# Patient Record
Sex: FEMALE | Race: WHITE | Employment: PART TIME | URBAN - METROPOLITAN AREA
[De-identification: names, ages, dates, MRNs, and addresses within clinical notes are randomized per-mention and may not be internally consistent; named-entity substitution may affect disease eponyms.]

---

## 2017-05-17 NOTE — PROGRESS NOTES
No beds available at: [facility], [address] [facility], [facility], [address], [facility] [address] or [facility], [address], [address], [facility] [address], [facility], [place], [address] [facility] [address] [facility], [address] [facility], [place], [place], [place], [place], [place], [address], [facility]

## 2017-05-17 NOTE — ED NOTES
Pt spoke with  marcello. Pt ok with informing  of her condition and status. .  Pt and  updated that we are waiting on a room at [facility]  Pt calm  Pt assited to the bathroom and giving breakfast but has not eatten

## 2017-05-17 NOTE — ED NOTES
PT arrives for medical clearance. Pt calm and cooperative. Pt undressed into hospital gown and socks. All belongings bagged and labeled and locked per policy. Pt given water per request. Labs obtained and sent. Pt orientated to the room and plan of care.

## 2017-05-17 NOTE — ED PROVIDER NOTES
Patient Seen in: [facility] Emergency Department    History   Patient presents with:  Eval-P (psychiatric)    Stated Complaint: Eval p, medical clearance    HPI    78-year-old female with history of depression, anxiety, presents emergency room for m signs reviewed. All other systems reviewed and negative except as noted above. PSFH elements reviewed from today and agreed except as otherwise stated in HPI.     Physical Exam       ED Triage Vitals   BP 05/16/17 2324 118/84 mmHg   Pulse 05/16/17 2 1.07 (*)     All other components within normal limits   ETHYL ALCOHOL - Normal   ACETAMINOPHEN (TYLENOL), S - Normal   SALICYLATE, SERUM - Normal   CBC WITH DIFFERENTIAL WITH PLATELET    Narrative:      The following orders were created for panel order CBC

## 2017-05-17 NOTE — ED NOTES
[facility] FROM [facility] CALLED WITH ACCEPTING PHYSICIAN DR. J Luis Lee. PT WILL BE GOING TO ROOM 611-B, RN TO RN REPORT # [id number].

## 2017-07-24 PROCEDURE — 88175 CYTOPATH C/V AUTO FLUID REDO: CPT | Performed by: INTERNAL MEDICINE

## 2017-07-24 PROCEDURE — 87624 HPV HI-RISK TYP POOLED RSLT: CPT | Performed by: INTERNAL MEDICINE

## 2017-07-25 PROCEDURE — 84146 ASSAY OF PROLACTIN: CPT | Performed by: INTERNAL MEDICINE

## 2017-09-06 NOTE — ED NOTES
Patient has changed into patient gown and 2 bags of belongings placed into green safety bag # [id number].

## 2017-09-06 NOTE — ED NOTES
Call received from [facility] at [facility]. Awaiting specific bed assignment. Charge RN Allison Bejarano made aware and will get in contact with [facility] for updates.

## 2017-09-06 NOTE — ED NOTES
Spoke with Lucian at [facility] regarding transfer to [facility]. Informed that the nurse Wero Blackburn will be consulting with admitting psychiatrist and will call this RN for report when they are ready for patient to be transferred.

## 2017-09-06 NOTE — ED NOTES
Report received from Lady Herson RN at this time. Lady Bains had just received a call from Otis at [facility] at ext [phone] and to call back shortly.

## 2017-09-06 NOTE — ED PROVIDER NOTES
Patient Seen in: [facility] Emergency Department    History   Patient presents with:  Eval-P (psychiatric)    Stated Complaint: eval p, SI    HPI    25-year-old female sent here by her psychiatrist due to report of suicidal ideations.   The patient s Temporal  SpO2: 98 %  O2 Device: None (Room air)    Current:/89   Pulse 98   Temp 98 °F (36.7 °C) (Temporal)   Resp 18   Ht 165.1 cm (5' 5\")   Wt 81.2 kg   LMP 08/29/2017   SpO2 98%   BMI 29.79 kg/m²         Physical Exam      General: This a pleasa Discharge Medication List

## 2017-09-06 NOTE — PROGRESS NOTES
9/6/2017  5:36 PM         []Manual[]Template  []Copied  Tried to talked to Gaurav Anne RN, to give nurse to nurse report number but she is very busy with other patient. She will call me back.

## 2017-09-06 NOTE — ED NOTES
Pt noted at this time to have 6 stud earrings in each ear and lobes. Pt also noted to have what appears to be 2 maral bands on left 4th finger. One band with 3 large clear maral appearing stones and another flat band.

## 2017-09-06 NOTE — ED NOTES
Pt awake and alert, appears comfortable. Pt tolerated dinner tray well. Pt aware awaiting transport to [facility] at this time. Pt appears calm and cooperative, in no distress at this time.

## 2017-09-06 NOTE — ED INITIAL ASSESSMENT (HPI)
Patient arrives by ems from outpatient [facility] site for increasing depression/SI over past 3 months. Reports hx of SI but no suicidal attempts. Reports past inpatient hospitalizations for same. States she was started on lithium 1 week ago.  Arrives calm and

## 2017-09-06 NOTE — PROGRESS NOTES
Talked to Dixie Parents charge nurse that patient room number is 832-B, per Kavya Grimm RN [facility]) she already received a nurse to nurse report to [facility].

## 2017-09-06 NOTE — ED NOTES
Spoke with Royal Mancini from [facility] regarding patient. Informed that they already know about patient because Dr. Louie Contreras, her psychologist, wants to arrange direct admit. Cert/petition completed by Dr. Louie Contreras at patient's office visit today.  Faxed to [facility] at [facility]

## 2018-03-19 PROCEDURE — 87086 URINE CULTURE/COLONY COUNT: CPT | Performed by: INTERNAL MEDICINE

## 2018-04-16 ENCOUNTER — HOSPITAL ENCOUNTER (EMERGENCY)
Facility: HOSPITAL | Age: 33
Discharge: NURSING, REHAB OR LONG-TERM CARE | End: 2018-04-17
Attending: EMERGENCY MEDICINE
Payer: COMMERCIAL

## 2018-04-16 DIAGNOSIS — Z00.8 MEDICAL CLEARANCE FOR PSYCHIATRIC ADMISSION: Primary | ICD-10-CM

## 2018-04-16 PROCEDURE — 99285 EMERGENCY DEPT VISIT HI MDM: CPT

## 2018-04-16 PROCEDURE — 96372 THER/PROPH/DIAG INJ SC/IM: CPT

## 2018-04-17 VITALS
OXYGEN SATURATION: 98 % | HEART RATE: 80 BPM | WEIGHT: 180 LBS | BODY MASS INDEX: 29.99 KG/M2 | RESPIRATION RATE: 18 BRPM | TEMPERATURE: 97 F | DIASTOLIC BLOOD PRESSURE: 85 MMHG | HEIGHT: 65 IN | SYSTOLIC BLOOD PRESSURE: 127 MMHG

## 2018-04-17 PROBLEM — F33.2 MDD (MAJOR DEPRESSIVE DISORDER), RECURRENT EPISODE, SEVERE (HCC): Status: ACTIVE | Noted: 2018-04-17

## 2018-04-17 RX ORDER — LORAZEPAM 2 MG/ML
1 INJECTION INTRAMUSCULAR ONCE
Status: COMPLETED | OUTPATIENT
Start: 2018-04-17 | End: 2018-04-17

## 2018-04-17 RX ORDER — IBUPROFEN 600 MG/1
600 TABLET ORAL ONCE
Status: COMPLETED | OUTPATIENT
Start: 2018-04-17 | End: 2018-04-17

## 2018-04-17 NOTE — ED NOTES
For direct admit to [facility] under Dr Antoine Thorpe #[id number], awaiting call back fro report

## 2018-04-17 NOTE — ED PROVIDER NOTES
Patient Seen in: [facility] Emergency Department    History   Patient presents with:  Eval-P (psychiatric)    Stated Complaint: eval p    HPI     Patient is suicidal with plan.   She complains of feeling depressed and wants to kill herself by hanging Neurological: She is alert and oriented to person, place, and time. She exhibits normal muscle tone. Coordination normal.   Skin: Skin is warm and dry. No rash noted. Psychiatric: She has a normal mood and affect.  Her behavior is normal.   Nursing note

## 2018-04-17 NOTE — ED NOTES
[facility] staff called back with report of possible direct admite to [facility] upon completion of needed documents

## 2018-04-17 NOTE — ED INITIAL ASSESSMENT (HPI)
Brought in by ambulance from [facility] with petition for SI with plan. Apparently pt on OP group tx when she started to verbalizing SI with plans \"not feeling safe\".  Recently seen/discharge from [facility]

## 2018-04-24 ENCOUNTER — APPOINTMENT (OUTPATIENT)
Dept: ULTRASOUND IMAGING | Facility: HOSPITAL | Age: 33
End: 2018-04-24
Attending: EMERGENCY MEDICINE
Payer: COMMERCIAL

## 2018-04-24 ENCOUNTER — HOSPITAL ENCOUNTER (EMERGENCY)
Facility: HOSPITAL | Age: 33
Discharge: NURSING, REHAB OR LONG-TERM CARE | End: 2018-04-25
Attending: EMERGENCY MEDICINE
Payer: COMMERCIAL

## 2018-04-24 DIAGNOSIS — R10.30 LOWER ABDOMINAL PAIN: ICD-10-CM

## 2018-04-24 DIAGNOSIS — F32.A DEPRESSION, UNSPECIFIED DEPRESSION TYPE: Primary | ICD-10-CM

## 2018-04-24 DIAGNOSIS — R45.851 SUICIDAL IDEATION: ICD-10-CM

## 2018-04-24 PROCEDURE — 76856 US EXAM PELVIC COMPLETE: CPT | Performed by: EMERGENCY MEDICINE

## 2018-04-24 PROCEDURE — 87591 N.GONORRHOEAE DNA AMP PROB: CPT | Performed by: EMERGENCY MEDICINE

## 2018-04-24 PROCEDURE — 85025 COMPLETE CBC W/AUTO DIFF WBC: CPT | Performed by: EMERGENCY MEDICINE

## 2018-04-24 PROCEDURE — 80307 DRUG TEST PRSMV CHEM ANLYZR: CPT | Performed by: EMERGENCY MEDICINE

## 2018-04-24 PROCEDURE — 99285 EMERGENCY DEPT VISIT HI MDM: CPT

## 2018-04-24 PROCEDURE — 80053 COMPREHEN METABOLIC PANEL: CPT | Performed by: EMERGENCY MEDICINE

## 2018-04-24 PROCEDURE — 87660 TRICHOMONAS VAGIN DIR PROBE: CPT | Performed by: EMERGENCY MEDICINE

## 2018-04-24 PROCEDURE — 87480 CANDIDA DNA DIR PROBE: CPT | Performed by: EMERGENCY MEDICINE

## 2018-04-24 PROCEDURE — 80329 ANALGESICS NON-OPIOID 1 OR 2: CPT | Performed by: EMERGENCY MEDICINE

## 2018-04-24 PROCEDURE — 76830 TRANSVAGINAL US NON-OB: CPT | Performed by: EMERGENCY MEDICINE

## 2018-04-24 PROCEDURE — 96374 THER/PROPH/DIAG INJ IV PUSH: CPT

## 2018-04-24 PROCEDURE — 87510 GARDNER VAG DNA DIR PROBE: CPT | Performed by: EMERGENCY MEDICINE

## 2018-04-24 PROCEDURE — 81025 URINE PREGNANCY TEST: CPT

## 2018-04-24 PROCEDURE — 96375 TX/PRO/DX INJ NEW DRUG ADDON: CPT

## 2018-04-24 PROCEDURE — 96376 TX/PRO/DX INJ SAME DRUG ADON: CPT

## 2018-04-24 PROCEDURE — 87491 CHLMYD TRACH DNA AMP PROBE: CPT | Performed by: EMERGENCY MEDICINE

## 2018-04-24 PROCEDURE — 93975 VASCULAR STUDY: CPT | Performed by: EMERGENCY MEDICINE

## 2018-04-24 RX ORDER — LORAZEPAM 2 MG/ML
1 INJECTION INTRAMUSCULAR ONCE
Status: COMPLETED | OUTPATIENT
Start: 2018-04-24 | End: 2018-04-24

## 2018-04-24 RX ORDER — LORAZEPAM 1 MG/1
1 TABLET ORAL EVERY 4 HOURS PRN
Status: DISCONTINUED | OUTPATIENT
Start: 2018-04-24 | End: 2018-04-25

## 2018-04-24 RX ORDER — LORAZEPAM 1 MG/1
0.5 TABLET ORAL EVERY 4 HOURS PRN
Status: DISCONTINUED | OUTPATIENT
Start: 2018-04-24 | End: 2018-04-25

## 2018-04-24 RX ORDER — TRAZODONE HYDROCHLORIDE 50 MG/1
50 TABLET ORAL NIGHTLY PRN
Status: DISCONTINUED | OUTPATIENT
Start: 2018-04-24 | End: 2018-04-25

## 2018-04-24 RX ORDER — LORAZEPAM 2 MG/ML
2 INJECTION INTRAMUSCULAR EVERY 4 HOURS PRN
Status: DISCONTINUED | OUTPATIENT
Start: 2018-04-24 | End: 2018-04-25

## 2018-04-24 RX ORDER — LORAZEPAM 1 MG/1
2 TABLET ORAL EVERY 4 HOURS PRN
Status: DISCONTINUED | OUTPATIENT
Start: 2018-04-24 | End: 2018-04-25

## 2018-04-24 RX ORDER — HALOPERIDOL 5 MG
5 TABLET ORAL EVERY 4 HOURS PRN
Status: DISCONTINUED | OUTPATIENT
Start: 2018-04-24 | End: 2018-04-25

## 2018-04-24 RX ORDER — HALOPERIDOL 5 MG/ML
5 INJECTION INTRAMUSCULAR EVERY 4 HOURS PRN
Status: DISCONTINUED | OUTPATIENT
Start: 2018-04-24 | End: 2018-04-25

## 2018-04-24 RX ORDER — LORAZEPAM 2 MG/ML
1 INJECTION INTRAMUSCULAR EVERY 4 HOURS PRN
Status: DISCONTINUED | OUTPATIENT
Start: 2018-04-24 | End: 2018-04-25

## 2018-04-24 RX ORDER — LORAZEPAM 2 MG/ML
0.5 INJECTION INTRAMUSCULAR EVERY 4 HOURS PRN
Status: DISCONTINUED | OUTPATIENT
Start: 2018-04-24 | End: 2018-04-25

## 2018-04-24 NOTE — CM/SW NOTE
TC from Dr. Brendon Cain. ED CM has her cell phone number for [facility] to contact if needed. Patient suicidal. Jessica Wahl completed but paramedics forgot. Dr. Jayden Rainey is faxing to [facility].      Patient discharged from [facility] 4/20

## 2018-04-24 NOTE — ED NOTES
Picked up from medical office with si/hi with plan  Patient didn't want to come in pd and  talked her into coming  She is calm/cooperative with minimal communication

## 2018-04-25 VITALS
SYSTOLIC BLOOD PRESSURE: 105 MMHG | OXYGEN SATURATION: 97 % | RESPIRATION RATE: 16 BRPM | DIASTOLIC BLOOD PRESSURE: 71 MMHG | BODY MASS INDEX: 30.32 KG/M2 | TEMPERATURE: 98 F | HEIGHT: 65 IN | WEIGHT: 182 LBS | HEART RATE: 79 BPM

## 2018-04-25 PROBLEM — F33.9 MAJOR DEPRESSION, RECURRENT (HCC): Status: ACTIVE | Noted: 2018-04-25

## 2018-04-25 PROBLEM — F33.9 MAJOR DEPRESSION, RECURRENT: Status: ACTIVE | Noted: 2018-04-25

## 2018-04-25 NOTE — BH LEVEL OF CARE ASSESSMENT
Level of Care Assessment Note    General Questions  Why are you here?: PT IS A 32 YR OLD FEMALE WHO ARRIVES TO THE ER VIA EMS FROM HER THERAPIST'S OFFICE D/T SI W/ PLAN & STRONG INTENT TO HANG HERSELF.   PT ADMITS THAT SHE WAS RESISTANT TO GOING TO THE HOSP Suicidal Ideation: 04/24/18  Describe Current/Recent Suicidal Ideation: WORSENING SI x 4/21/2018  Current/Recent/Future Suicide Plan: Yes  Date of Most Recent Suicide Plan: 04/24/18  Describe Current/Recent/Future Suicide Plan: HANG SELF  Current/Recent/Fu 4/17 - 4/20/2018  Reason: DEPRESSION, SI  Prior [facility] PHP/IOP  Name: (1)  ANXIETY D/O IOP:  7/14 - 8/15/2016  Dates of Treatment: (2)  MH/IOP:  12/9 - 12/29/2016  Date Last Seen: (3)  ANXIETY D/O IOP:  5/23 - 6/7/2017  Prior Other Inpatient  Name: 4 PRIOR IN Clear  Cognition  Concentration: Unimpaired  Memory: Recent memory intact; Remote memory intact  Orientation Level: Oriented X4  Insight: Poor  Judgment: Poor  Thought Patterns  Clarity/Relevance: Coherent;Logical;Relevant to topic  Flow: Organized  Content

## 2018-04-25 NOTE — ED NOTES
IV Haldol 5 mg given. Pt updated of the POC, will transfer to [facility] once medically cleared. Pt voiced understanding.

## 2018-04-25 NOTE — ED NOTES
Pt pacing in room, states, \"I hate being in here, I should have just done it. \" IV Ativan 1 mg given as ordered.  Will monitor

## 2018-04-25 NOTE — ED INITIAL ASSESSMENT (HPI)
Pt alert, oriented x4, states she was at her psychologist office today for follow, advised to come for eval. Pt arrived via EMS. She admits SI with a plan, \"to hang myself. \" Pt states she just dc'd from [facility] on Friday.  Pt denies ETOH, denies recreational

## 2018-04-25 NOTE — ED NOTES
Pt continues to pace in room, \"the medicine is not helping, I'm so anxious, I hate being here. \" IV Ativan 1 mg given

## 2018-04-25 NOTE — ED PROVIDER NOTES
Patient Seen in: [facility] Emergency Department    History   Patient presents with:  Eval-P (psychiatric)    Stated Complaint: eval p    HPI    Patient is a 29-year-old female with a history of major depressive disorder, anxiety disorder, borderlin °F (36.6 °C) [04/24/18 1939]  Temp src: Temporal [04/24/18 1939]  SpO2: 99 % [04/24/18 1939]  O2 Device: None (Room air) [04/24/18 1939]    Current:/77   Pulse 88   Temp 97.9 °F (36.6 °C) (Temporal)   Resp 18   Ht 165.1 cm (5' 5\")   Wt 82.6 kg   LMP WITH PLATELET.   Procedure                               Abnormality         Status                     ---------                               -----------         ------                     CBC W/ DIFFERENTIAL[[id number]]          Abnormal            Final

## 2018-04-25 NOTE — ED NOTES
Returned from [address], pt ambulated to the bathroom with PCT, Becky Mendez. Pt states, \"I need more medicine to calm me down, it's not helping. I'm going crazy here. \"

## 2018-05-02 PROBLEM — F33.2 MDD (MAJOR DEPRESSIVE DISORDER), RECURRENT SEVERE, WITHOUT PSYCHOSIS (HCC): Status: ACTIVE | Noted: 2018-04-17

## 2018-05-22 NOTE — ED PROVIDER NOTES
Patient calm and cooperative during the shift.   Awaiting transport to behavioral health Hospital for evaluation of Suicidal ideation

## 2018-05-22 NOTE — ED NOTES
Pt awake, ambulated to washroom, gait steady. Breakfast tray ordered. Pt requesting update, Sylvester Pagan from [facility] notified and at pt's bedside to give pt update.

## 2018-05-22 NOTE — BH PROGRESS NOTE
Report given to [facility] at [facility] Adult unit. Patience can call for RN to RN and ambulance now. Voicemail left for pt's , Corrine Mckay, about pt being transferred to [facility] and phone number to call back for information. Sidney's #[phone].

## 2018-05-22 NOTE — ED PROVIDER NOTES
Patient Seen in: [facility] Emergency Department    History   Patient presents with:  Eval-P (psychiatric)    Stated Complaint: eval p    HPI    Patient is a 36-year-old female, presenting for evaluation after making a suicidal statement to a friend reactive to light. Oropharynx is pink and moist.  NECK: Neck is supple. The trachea is midline. LUNGS: Lungs are clear to auscultation bilaterally, respirations are unlabored. HEART: Regular rate and rhythm. There are no rubs or gallops.    ABDOMEN: The Sera evaluation requested. Blood was drawn and sent to the laboratory for further evaluation.     Patient was endorsed to the oncoming physician who will make the appropriate disposition once to Jeremias Santana evaluation has been completed and recommendations

## 2018-05-22 NOTE — BH PROGRESS NOTE
Pt updated about plan of working on transferring out or waiting on d/c from [facility]. Pt aware there is no d/c at [facility] as of this morning.

## 2018-05-22 NOTE — ED NOTES
Pt ambulated to bathroom, steady gait. Requesting another room, declined. Prn med offered. Pt ok with plan.

## 2018-05-22 NOTE — BH PROGRESS NOTE
Will update pt on transfer to [facility], waiting on [facility] unit to give the okay for report, will update EDW RN, and  after RN to RN can be given.

## 2018-05-22 NOTE — ED NOTES
PT is calm and cooperative. Pt undressed into hospital gown and socks per policy. Walked to restroom with steady gait. Tolerated lab draw. Pt requesting door to remain open. Explained policy to pt that door needs to remain locked. Verbalized understanding.

## 2018-05-22 NOTE — BH LEVEL OF CARE ASSESSMENT
Level of Care Assessment Note    General Questions  Why are you here?: Pt is a 33 yr old female who arrived to the ER via ambulance due to Pargi 1. Pt states \"I was texting one of my good friends. \" Pt states \"at first I said I wanted to self harm which I don a friend that she was thinking about killing herself. Pt states she bought a rope recently and thought about hanging herself but hasn't done anything recently.     Family Collateral  Family Collateral: none available  Reason Patient is Here Today: na  Famil Other (\"I really need to have this planned because I would be impulsive about it. \")  Protective Factors: \"I'm alive so I don't ruin my friends and family's lives. I don't care about my life. \" Pt states the SI has been going on forever.  States she's \"a Yes  Date of Past Occurence:  (6 yrs ago)  Describe Past Self-Injurious Behaviors: hx of self harm 6 yrs ago  Present Self-Injurious Behaviors: No    Mental Health Symptoms  Hallucination Type: No problems reported or observed  Delusions: No problems repor her weekly on Tuesdays  Reason: counseling  Prior [facility] Inpatient  Name: [facility] inpt   Dates of Treatment: April 2014; July 2016; May 2017; Sept 2017; 4/17-4/20/18; 4/25-4/29/18  Date Last Seen: 4/29/18  Reason: Depression, SI  Prior [facility] PHP/IOP  Name: IOP/PHP symptoms  Current Withdrawal Symptoms: No    Compulsive Behaviors  Are you/others concerned about any of the following behaviors over the past 30 days?: Denies                                              Functional Impairment  Currently Attending School: d/c from inpt at [facility] on 4/29/18. Judgment: Poor  Fair/poor judgment as evidenced by: pt reports texting her friend tonight reporting SI with plan to slit wrist and lay in bathtub. Friend called 911.  Pt states she left her home and if she would have gotten Anxiety, BPD, and Alcohol Abuse. Pt has a hx of inpt admissions and IOP/PHP. Pt currently has a psychiatrist and therapist.     Risk/Protective Factors  Risk Factors: Current suicidal behavior; History of suicidal behavior; Family history of suicidal behavio

## 2018-05-22 NOTE — BH LEVEL OF CARE ASSESSMENT
Level of Care Assessment Note    General Questions  Why are you here?: Pt is a 33 yr old female who arrived to the ER via ambulance due to Pargi 1. Pt states \"I was texting one of my good friends. \" Pt states \"at first I said I wanted to self harm which I don a friend that she was thinking about killing herself. Pt states she bought a rope recently and thought about hanging herself but hasn't done anything recently.     Family Collateral  Family Collateral: none available  Reason Patient is Here Today: na  Famil Other (\"I really need to have this planned because I would be impulsive about it. \")  Protective Factors: \"I'm alive so I don't ruin my friends and family's lives. I don't care about my life. \" Pt states the SI has been going on forever.  States she's \"a Yes  Date of Past Occurence:  (6 yrs ago)  Describe Past Self-Injurious Behaviors: hx of self harm 6 yrs ago  Present Self-Injurious Behaviors: No    Mental Health Symptoms  Hallucination Type: No problems reported or observed  Delusions: No problems repor her weekly on Tuesdays  Reason: counseling  Prior [facility] Inpatient  Name: [facility] inpt   Dates of Treatment: April 2014; July 2016; May 2017; Sept 2017; 4/17-4/20/18; 4/25-4/29/18  Date Last Seen: 4/29/18  Reason: Depression, SI  Prior [facility] PHP/IOP  Name: IOP/PHP symptoms  Current Withdrawal Symptoms: No    Compulsive Behaviors  Are you/others concerned about any of the following behaviors over the past 30 days?: Denies                                              Functional Impairment  Currently Attending School: judgment as evidenced by: pt reports texting her friend tonight reporting SI with plan to slit wrist and lay in bathtub. Friend called 911.  Pt states she left her home and if she would have gotten in her car rather than walk, she might have driven to a hot currently has a psychiatrist and therapist.     Risk/Protective Factors  Risk Factors: Current suicidal behavior; History of suicidal behavior; Family history of suicidal behavior;Current/past psychiatric disorder;Substance use or abuse  Protective Factors:

## 2018-05-22 NOTE — ED INITIAL ASSESSMENT (HPI)
Pt arrives with medics. Pt texted her friend that she was \"thinking about killing her self\" pt states she hasnt done anything to hurt herself but she did buy a rope recently and has thought about hanging herself.

## 2018-05-23 PROBLEM — F32.9 MDD (MAJOR DEPRESSIVE DISORDER): Status: ACTIVE | Noted: 2018-05-23

## 2018-10-05 NOTE — ED NOTES
Anni Noriega from [facility] states patient will be discharged home after she spoke with pts psychiatrist. Lizzie Choe  is on the way to pick her up. Pt updated and aware of plan of care.

## 2018-10-05 NOTE — ED INITIAL ASSESSMENT (HPI)
Pt here for suicidal thoughts, has been inpatient 4 times this year for SI, denies any triggers to todays behavior.  Denies any plan for SI.

## 2018-10-05 NOTE — BH LEVEL OF CARE ASSESSMENT
Level of Care Assessment Note    General Questions  Why are you here?: Pt. reports that she left IOP today early because she was very tired.  She went home and called therapist at program. Pt told her therapist on the phone \"I don't want die, but I feel li had some intention of acting on them? (past 30 days): Yes(pt knows how she could complete suicide, but replied \"I don't really know\" to intent)  5a. Have you started to work out or worked out the details of how to kill yourself? (past 30 days): No  5b.  D Removal of Access to Means: patient has safety plan from [facility]- due to risk of hanging or slitting wrists, but not with knife  Access to Firearm/Weapon: No  Discussion of Removal of Firearm/Weapon: n/a  Do you have a firearm owner ID card?: No  [employer] Clinical Volodymyr  Dates of Treatment: unknown   Date Last Seen: Tuesday this week  Reason: depression/anxiety  Effectiveness: helpful  Prior [facility] Inpatient  Name: [facility] inpatient 4x times  Dates of Treatment: April 2014, July 2016, May 2017, Sept 2017, 4/17/1 continues to use, but never offers her a drink     Withdrawal Symptoms  History of Withdrawal Symptoms: Anxiety; Headaches  Last Withdrawal Episode: April 19, 2014 last time she drank at [facility] and wanted to kill himself,  stated she had sto Level: Oriented X4  Insight: Poor  Fair/poor insight as evidenced by: Pt is unable to identify triggers for her decline in function. Judgment: Poor  Fair/poor judgment as evidenced by: Patient made suicidal statements and left her IOP program early today. Suicide  Medical Precautions: None  Refused Treatment: No             Pervasive Diagnoses  Neurodevelopmental Disorders: Deferred  Personality Disorders: Deferred  Pertinent Non-psychiatric Diagnoses: n/a                   SRAT Review  Behavioral [place]

## 2018-10-05 NOTE — ED PROVIDER NOTES
Patient Seen in: [facility] Emergency Department    History   Patient presents with:  Eval-P (psychiatric)    Stated Complaint: SI    HPI     32 yo F, hx of depression has been hospitalized before for this, sent by her therapist for suicidal thought Musculoskeletal: Normal range of motion. Neurological: She is alert and oriented to person, place, and time. She has normal reflexes. Skin: Skin is warm and dry.    Psychiatric: Her behavior is normal. Thought content normal.           ED Course     L

## 2019-02-26 ENCOUNTER — APPOINTMENT (OUTPATIENT)
Dept: LAB | Age: 34
End: 2019-02-26
Attending: Other
Payer: COMMERCIAL

## 2019-02-26 PROCEDURE — 84146 ASSAY OF PROLACTIN: CPT | Performed by: OTHER

## 2019-02-26 PROCEDURE — 36415 COLL VENOUS BLD VENIPUNCTURE: CPT | Performed by: OTHER

## 2019-04-16 PROBLEM — F32.9 MAJOR DEPRESSION: Status: ACTIVE | Noted: 2019-04-16

## 2019-04-19 NOTE — PAT NURSING NOTE
Spoke to [facility] to advise that patient needs urine toxicology prior to electroconvulsive therapy treatment on Monday,APril 22.

## 2019-04-20 ENCOUNTER — HOSPITAL ENCOUNTER (OUTPATIENT)
Dept: GENERAL RADIOLOGY | Facility: HOSPITAL | Age: 34
Discharge: HOME | End: 2019-04-20
Payer: COMMERCIAL

## 2019-04-20 ENCOUNTER — APPOINTMENT (OUTPATIENT)
Dept: GENERAL RADIOLOGY | Facility: HOSPITAL | Age: 34
End: 2019-04-20
Payer: COMMERCIAL

## 2019-04-20 ENCOUNTER — APPOINTMENT (OUTPATIENT)
Dept: GENERAL RADIOLOGY | Facility: HOSPITAL | Age: 34
End: 2019-04-20
Attending: Other
Payer: COMMERCIAL

## 2019-04-20 PROCEDURE — 71046 X-RAY EXAM CHEST 2 VIEWS: CPT | Performed by: OTHER

## 2019-04-20 PROCEDURE — 72100 X-RAY EXAM L-S SPINE 2/3 VWS: CPT | Performed by: OTHER

## 2019-04-22 ENCOUNTER — HOSPITAL ENCOUNTER (OUTPATIENT)
Dept: POSTOP/PACU | Facility: HOSPITAL | Age: 34
Discharge: HOME | End: 2019-04-22
Attending: Other
Payer: COMMERCIAL

## 2019-04-22 VITALS
TEMPERATURE: 99 F | HEIGHT: 65 IN | WEIGHT: 240 LBS | OXYGEN SATURATION: 97 % | SYSTOLIC BLOOD PRESSURE: 133 MMHG | RESPIRATION RATE: 15 BRPM | DIASTOLIC BLOOD PRESSURE: 91 MMHG | HEART RATE: 100 BPM | BODY MASS INDEX: 39.99 KG/M2

## 2019-04-22 DIAGNOSIS — F33.2 SEVERE EPISODE OF RECURRENT MAJOR DEPRESSIVE DISORDER, WITHOUT PSYCHOTIC FEATURES (HCC): ICD-10-CM

## 2019-04-22 RX ORDER — ONDANSETRON 2 MG/ML
4 INJECTION INTRAMUSCULAR; INTRAVENOUS ONCE
Status: COMPLETED | OUTPATIENT
Start: 2019-04-22 | End: 2019-04-22

## 2019-04-22 RX ORDER — SODIUM CHLORIDE, SODIUM LACTATE, POTASSIUM CHLORIDE, CALCIUM CHLORIDE 600; 310; 30; 20 MG/100ML; MG/100ML; MG/100ML; MG/100ML
INJECTION, SOLUTION INTRAVENOUS CONTINUOUS
Status: DISCONTINUED | OUTPATIENT
Start: 2019-04-22 | End: 2019-04-22 | Stop reason: HOSPADM

## 2019-04-22 RX ORDER — GLYCOPYRROLATE 0.2 MG/ML
INJECTION, SOLUTION INTRAMUSCULAR; INTRAVENOUS
Status: COMPLETED
Start: 2019-04-22 | End: 2019-04-22

## 2019-04-22 RX ORDER — GLYCOPYRROLATE 0.2 MG/ML
0.2 INJECTION, SOLUTION INTRAMUSCULAR; INTRAVENOUS ONCE
Status: COMPLETED | OUTPATIENT
Start: 2019-04-22 | End: 2019-04-22

## 2019-04-22 RX ORDER — HYDROMORPHONE HYDROCHLORIDE 1 MG/ML
0.4 INJECTION, SOLUTION INTRAMUSCULAR; INTRAVENOUS; SUBCUTANEOUS EVERY 5 MIN PRN
Status: DISCONTINUED | OUTPATIENT
Start: 2019-04-22 | End: 2019-04-22 | Stop reason: HOSPADM

## 2019-04-22 RX ORDER — KETOROLAC TROMETHAMINE 30 MG/ML
INJECTION, SOLUTION INTRAMUSCULAR; INTRAVENOUS
Status: COMPLETED
Start: 2019-04-22 | End: 2019-04-22

## 2019-04-22 RX ORDER — SODIUM CHLORIDE, SODIUM LACTATE, POTASSIUM CHLORIDE, CALCIUM CHLORIDE 600; 310; 30; 20 MG/100ML; MG/100ML; MG/100ML; MG/100ML
INJECTION, SOLUTION INTRAVENOUS CONTINUOUS
Status: DISCONTINUED | OUTPATIENT
Start: 2019-04-22 | End: 2019-04-24

## 2019-04-22 RX ORDER — NALOXONE HYDROCHLORIDE 0.4 MG/ML
80 INJECTION, SOLUTION INTRAMUSCULAR; INTRAVENOUS; SUBCUTANEOUS AS NEEDED
Status: DISCONTINUED | OUTPATIENT
Start: 2019-04-22 | End: 2019-04-22 | Stop reason: HOSPADM

## 2019-04-22 RX ORDER — ONDANSETRON 2 MG/ML
INJECTION INTRAMUSCULAR; INTRAVENOUS
Status: COMPLETED
Start: 2019-04-22 | End: 2019-04-22

## 2019-04-22 RX ORDER — KETOROLAC TROMETHAMINE 30 MG/ML
30 INJECTION, SOLUTION INTRAMUSCULAR; INTRAVENOUS ONCE
Status: COMPLETED | OUTPATIENT
Start: 2019-04-22 | End: 2019-04-22

## 2019-04-22 RX ADMIN — ONDANSETRON 4 MG: 2 INJECTION INTRAMUSCULAR; INTRAVENOUS at 06:16:00

## 2019-04-22 RX ADMIN — SODIUM CHLORIDE, SODIUM LACTATE, POTASSIUM CHLORIDE, CALCIUM CHLORIDE: 600; 310; 30; 20 INJECTION, SOLUTION INTRAVENOUS at 06:16:00

## 2019-04-22 RX ADMIN — KETOROLAC TROMETHAMINE 30 MG: 30 INJECTION, SOLUTION INTRAMUSCULAR; INTRAVENOUS at 06:16:00

## 2019-04-22 RX ADMIN — GLYCOPYRROLATE 0.2 MG: 0.2 INJECTION, SOLUTION INTRAMUSCULAR; INTRAVENOUS at 06:17:00

## 2019-04-22 NOTE — PROGRESS NOTES
[facility] / [facility]  ECT Procedure Note    Kala Damian Patient Status:  Outpatient   Age/Gender 29year old female MRN [id number]   Location [address] [facility] Attending Glennie Cushing, MD   Hosp Day # 0 PCP

## 2019-04-23 NOTE — PROGRESS NOTES
[facility] / [facility]  ECT History & Physical    Jalcyn Darnell Patient Status:  Outpatient   Age/Gender 29year old female MRN [id number]   Location [address] [facility] Attending Tyson Curran MD   Hosp Day # 0 PCP Delmy Nicole User      Tobacco comment: Vaping    Substance and Sexual Activity      Alcohol use: Yes        Comment: rare      Drug use: No        Comment: one episode last Friday smoked weed but never since college      Sexual activity: Not on file    Lifestyle extremities   Skin:   Skin color, texture, turgor normal, no rashes or lesions   Neurologic:   CNII-XII intact, normal strength, sensation and reflexes     throughout     Impressions & Plans: F 33.2, bifrontal ECT    I have discussed the risks and benefits

## 2019-04-24 ENCOUNTER — HOSPITAL ENCOUNTER (OUTPATIENT)
Dept: POSTOP/PACU | Facility: HOSPITAL | Age: 34
Discharge: HOME | End: 2019-04-24
Attending: Other
Payer: COMMERCIAL

## 2019-04-24 VITALS
HEART RATE: 87 BPM | OXYGEN SATURATION: 94 % | HEIGHT: 65 IN | SYSTOLIC BLOOD PRESSURE: 128 MMHG | TEMPERATURE: 99 F | RESPIRATION RATE: 19 BRPM | BODY MASS INDEX: 41 KG/M2 | DIASTOLIC BLOOD PRESSURE: 78 MMHG

## 2019-04-24 DIAGNOSIS — F33.2 SEVERE EPISODE OF RECURRENT MAJOR DEPRESSIVE DISORDER, WITHOUT PSYCHOTIC FEATURES (HCC): ICD-10-CM

## 2019-04-24 RX ORDER — MIDAZOLAM HYDROCHLORIDE 1 MG/ML
1 INJECTION INTRAMUSCULAR; INTRAVENOUS EVERY 5 MIN PRN
Status: DISCONTINUED | OUTPATIENT
Start: 2019-04-24 | End: 2019-04-24 | Stop reason: HOSPADM

## 2019-04-24 RX ORDER — KETOROLAC TROMETHAMINE 30 MG/ML
30 INJECTION, SOLUTION INTRAMUSCULAR; INTRAVENOUS ONCE
Status: COMPLETED | OUTPATIENT
Start: 2019-04-24 | End: 2019-04-24

## 2019-04-24 RX ORDER — ONDANSETRON 2 MG/ML
INJECTION INTRAMUSCULAR; INTRAVENOUS
Status: COMPLETED
Start: 2019-04-24 | End: 2019-04-24

## 2019-04-24 RX ORDER — ACETAMINOPHEN 500 MG
1000 TABLET ORAL ONCE AS NEEDED
Status: DISCONTINUED | OUTPATIENT
Start: 2019-04-24 | End: 2019-04-24 | Stop reason: HOSPADM

## 2019-04-24 RX ORDER — HYDROMORPHONE HYDROCHLORIDE 1 MG/ML
0.4 INJECTION, SOLUTION INTRAMUSCULAR; INTRAVENOUS; SUBCUTANEOUS EVERY 5 MIN PRN
Status: DISCONTINUED | OUTPATIENT
Start: 2019-04-24 | End: 2019-04-24 | Stop reason: HOSPADM

## 2019-04-24 RX ORDER — SODIUM CHLORIDE, SODIUM LACTATE, POTASSIUM CHLORIDE, CALCIUM CHLORIDE 600; 310; 30; 20 MG/100ML; MG/100ML; MG/100ML; MG/100ML
INJECTION, SOLUTION INTRAVENOUS CONTINUOUS
Status: DISCONTINUED | OUTPATIENT
Start: 2019-04-24 | End: 2019-04-26

## 2019-04-24 RX ORDER — SODIUM CHLORIDE, SODIUM LACTATE, POTASSIUM CHLORIDE, CALCIUM CHLORIDE 600; 310; 30; 20 MG/100ML; MG/100ML; MG/100ML; MG/100ML
INJECTION, SOLUTION INTRAVENOUS CONTINUOUS
Status: DISCONTINUED | OUTPATIENT
Start: 2019-04-24 | End: 2019-04-24 | Stop reason: HOSPADM

## 2019-04-24 RX ORDER — ONDANSETRON 2 MG/ML
4 INJECTION INTRAMUSCULAR; INTRAVENOUS ONCE
Status: COMPLETED | OUTPATIENT
Start: 2019-04-24 | End: 2019-04-24

## 2019-04-24 RX ORDER — KETOROLAC TROMETHAMINE 30 MG/ML
INJECTION, SOLUTION INTRAMUSCULAR; INTRAVENOUS
Status: COMPLETED
Start: 2019-04-24 | End: 2019-04-24

## 2019-04-24 RX ORDER — GLYCOPYRROLATE 0.2 MG/ML
INJECTION, SOLUTION INTRAMUSCULAR; INTRAVENOUS
Status: COMPLETED
Start: 2019-04-24 | End: 2019-04-24

## 2019-04-24 RX ORDER — GLYCOPYRROLATE 0.2 MG/ML
0.2 INJECTION, SOLUTION INTRAMUSCULAR; INTRAVENOUS ONCE
Status: COMPLETED | OUTPATIENT
Start: 2019-04-24 | End: 2019-04-24

## 2019-04-24 RX ORDER — NALOXONE HYDROCHLORIDE 0.4 MG/ML
80 INJECTION, SOLUTION INTRAMUSCULAR; INTRAVENOUS; SUBCUTANEOUS AS NEEDED
Status: DISCONTINUED | OUTPATIENT
Start: 2019-04-24 | End: 2019-04-24 | Stop reason: HOSPADM

## 2019-04-24 RX ADMIN — KETOROLAC TROMETHAMINE 30 MG: 30 INJECTION, SOLUTION INTRAMUSCULAR; INTRAVENOUS at 06:33:00

## 2019-04-24 RX ADMIN — GLYCOPYRROLATE 0.2 MG: 0.2 INJECTION, SOLUTION INTRAMUSCULAR; INTRAVENOUS at 06:30:00

## 2019-04-24 RX ADMIN — ONDANSETRON 4 MG: 2 INJECTION INTRAMUSCULAR; INTRAVENOUS at 06:35:00

## 2019-04-24 NOTE — PROGRESS NOTES
[facility] / [facility]  ECT History & Physical    Rhae John Darnell Patient Status:  Outpatient   Age/Gender 29year old female MRN [id number]   Location [address] [facility] Attending Bita Tate MD   Hosp Day # 0 PCP Manan Esparza Lungs:     Clear to auscultation bilaterally, respirations unlabored    Heart:    Regular rate and rhythm, S1 and S2 normal, no murmur, rub   or gallop   Abdomen:     Soft, non-tender, bowel sounds active all four quadrants,     no masses, no organomegal

## 2019-04-24 NOTE — PROGRESS NOTES
[facility] / [facility]  ECT Procedure Note    Champ Blower Patient Status:  Outpatient   Age/Gender 29year old female MRN [id number]   Location [address] [facility] Attending Melissa Barnes MD   Hosp Day # 0 PCP

## 2019-04-26 ENCOUNTER — HOSPITAL ENCOUNTER (OUTPATIENT)
Dept: POSTOP/PACU | Facility: HOSPITAL | Age: 34
Discharge: HOME | End: 2019-04-26
Attending: Other
Payer: COMMERCIAL

## 2019-04-26 VITALS
BODY MASS INDEX: 41 KG/M2 | SYSTOLIC BLOOD PRESSURE: 121 MMHG | RESPIRATION RATE: 18 BRPM | DIASTOLIC BLOOD PRESSURE: 86 MMHG | HEIGHT: 65 IN | TEMPERATURE: 99 F | OXYGEN SATURATION: 98 % | HEART RATE: 76 BPM

## 2019-04-26 DIAGNOSIS — F33.2 SEVERE EPISODE OF RECURRENT MAJOR DEPRESSIVE DISORDER, WITHOUT PSYCHOTIC FEATURES (HCC): ICD-10-CM

## 2019-04-26 RX ORDER — HYDROCODONE BITARTRATE AND ACETAMINOPHEN 10; 325 MG/1; MG/1
1 TABLET ORAL AS NEEDED
Status: DISCONTINUED | OUTPATIENT
Start: 2019-04-26 | End: 2019-04-28

## 2019-04-26 RX ORDER — KETOROLAC TROMETHAMINE 30 MG/ML
30 INJECTION, SOLUTION INTRAMUSCULAR; INTRAVENOUS ONCE
Status: COMPLETED | OUTPATIENT
Start: 2019-04-26 | End: 2019-04-26

## 2019-04-26 RX ORDER — GLYCOPYRROLATE 0.2 MG/ML
INJECTION, SOLUTION INTRAMUSCULAR; INTRAVENOUS
Status: COMPLETED
Start: 2019-04-26 | End: 2019-04-26

## 2019-04-26 RX ORDER — GLYCOPYRROLATE 0.2 MG/ML
0.2 INJECTION, SOLUTION INTRAMUSCULAR; INTRAVENOUS ONCE
Status: COMPLETED | OUTPATIENT
Start: 2019-04-26 | End: 2019-04-26

## 2019-04-26 RX ORDER — NALOXONE HYDROCHLORIDE 0.4 MG/ML
80 INJECTION, SOLUTION INTRAMUSCULAR; INTRAVENOUS; SUBCUTANEOUS AS NEEDED
Status: ACTIVE | OUTPATIENT
Start: 2019-04-26 | End: 2019-04-26

## 2019-04-26 RX ORDER — HYDROMORPHONE HYDROCHLORIDE 1 MG/ML
0.4 INJECTION, SOLUTION INTRAMUSCULAR; INTRAVENOUS; SUBCUTANEOUS EVERY 5 MIN PRN
Status: ACTIVE | OUTPATIENT
Start: 2019-04-26 | End: 2019-04-26

## 2019-04-26 RX ORDER — ONDANSETRON 2 MG/ML
4 INJECTION INTRAMUSCULAR; INTRAVENOUS ONCE
Status: COMPLETED | OUTPATIENT
Start: 2019-04-26 | End: 2019-04-26

## 2019-04-26 RX ORDER — ONDANSETRON 2 MG/ML
INJECTION INTRAMUSCULAR; INTRAVENOUS
Status: COMPLETED
Start: 2019-04-26 | End: 2019-04-26

## 2019-04-26 RX ORDER — HYDROCODONE BITARTRATE AND ACETAMINOPHEN 10; 325 MG/1; MG/1
2 TABLET ORAL AS NEEDED
Status: DISCONTINUED | OUTPATIENT
Start: 2019-04-26 | End: 2019-04-28

## 2019-04-26 RX ORDER — METOCLOPRAMIDE HYDROCHLORIDE 5 MG/ML
10 INJECTION INTRAMUSCULAR; INTRAVENOUS AS NEEDED
Status: ACTIVE | OUTPATIENT
Start: 2019-04-26 | End: 2019-04-26

## 2019-04-26 RX ORDER — SODIUM CHLORIDE, SODIUM LACTATE, POTASSIUM CHLORIDE, CALCIUM CHLORIDE 600; 310; 30; 20 MG/100ML; MG/100ML; MG/100ML; MG/100ML
INJECTION, SOLUTION INTRAVENOUS CONTINUOUS
Status: DISCONTINUED | OUTPATIENT
Start: 2019-04-26 | End: 2019-04-28

## 2019-04-26 RX ORDER — KETOROLAC TROMETHAMINE 30 MG/ML
INJECTION, SOLUTION INTRAMUSCULAR; INTRAVENOUS
Status: COMPLETED
Start: 2019-04-26 | End: 2019-04-26

## 2019-04-26 RX ORDER — ONDANSETRON 2 MG/ML
4 INJECTION INTRAMUSCULAR; INTRAVENOUS AS NEEDED
Status: ACTIVE | OUTPATIENT
Start: 2019-04-26 | End: 2019-04-26

## 2019-04-26 RX ADMIN — ONDANSETRON 4 MG: 2 INJECTION INTRAMUSCULAR; INTRAVENOUS at 06:30:00

## 2019-04-26 RX ADMIN — KETOROLAC TROMETHAMINE 30 MG: 30 INJECTION, SOLUTION INTRAMUSCULAR; INTRAVENOUS at 06:28:00

## 2019-04-26 RX ADMIN — GLYCOPYRROLATE 0.2 MG: 0.2 INJECTION, SOLUTION INTRAMUSCULAR; INTRAVENOUS at 06:30:00

## 2019-04-26 NOTE — PROGRESS NOTES
[facility] / [facility]  ECT History & Physical    Simón Darnell Patient Status:  Outpatient   Age/Gender 29year old female MRN [id number]   Location [address] [facility] Attending Zahraa Vega MD   Hosp Day # 0 PCP René Murrell auscultation bilaterally, respirations unlabored    Heart:    Regular rate and rhythm, S1 and S2 normal, no murmur, rub   or gallop   Abdomen:     Soft, non-tender, bowel sounds active all four quadrants,     no masses, no organomegaly   Extremities:   Ext

## 2019-04-26 NOTE — PROGRESS NOTES
[facility] / [facility]  ECT Procedure Note    Michelle Jerry Patient Status:  Outpatient   Age/Gender 29year old female MRN [id number]   Location [address] [facility] Attending Marvin Vu MD   Hosp Day # 0 PCP

## 2019-04-29 ENCOUNTER — HOSPITAL ENCOUNTER (OUTPATIENT)
Dept: POSTOP/PACU | Facility: HOSPITAL | Age: 34
Discharge: HOME | End: 2019-04-29
Attending: Other
Payer: COMMERCIAL

## 2019-04-29 VITALS
RESPIRATION RATE: 17 BRPM | HEART RATE: 77 BPM | BODY MASS INDEX: 41 KG/M2 | SYSTOLIC BLOOD PRESSURE: 123 MMHG | OXYGEN SATURATION: 95 % | DIASTOLIC BLOOD PRESSURE: 80 MMHG | HEIGHT: 65 IN | TEMPERATURE: 98 F

## 2019-04-29 DIAGNOSIS — F33.2 SEVERE EPISODE OF RECURRENT MAJOR DEPRESSIVE DISORDER, WITHOUT PSYCHOTIC FEATURES (HCC): ICD-10-CM

## 2019-04-29 DIAGNOSIS — F33.2 MDD (MAJOR DEPRESSIVE DISORDER), RECURRENT SEVERE, WITHOUT PSYCHOSIS (HCC): ICD-10-CM

## 2019-04-29 RX ORDER — GLYCOPYRROLATE 0.2 MG/ML
0.2 INJECTION, SOLUTION INTRAMUSCULAR; INTRAVENOUS ONCE
Status: COMPLETED | OUTPATIENT
Start: 2019-04-29 | End: 2019-04-29

## 2019-04-29 RX ORDER — MEPERIDINE HYDROCHLORIDE 25 MG/ML
12.5 INJECTION INTRAMUSCULAR; INTRAVENOUS; SUBCUTANEOUS AS NEEDED
Status: DISCONTINUED | OUTPATIENT
Start: 2019-04-29 | End: 2019-05-01

## 2019-04-29 RX ORDER — HYDROCODONE BITARTRATE AND ACETAMINOPHEN 5; 325 MG/1; MG/1
1 TABLET ORAL AS NEEDED
Status: DISCONTINUED | OUTPATIENT
Start: 2019-04-29 | End: 2019-05-01

## 2019-04-29 RX ORDER — ONDANSETRON 2 MG/ML
4 INJECTION INTRAMUSCULAR; INTRAVENOUS ONCE
Status: COMPLETED | OUTPATIENT
Start: 2019-04-29 | End: 2019-04-29

## 2019-04-29 RX ORDER — ACETAMINOPHEN 500 MG
1000 TABLET ORAL ONCE AS NEEDED
Status: ACTIVE | OUTPATIENT
Start: 2019-04-29 | End: 2019-04-29

## 2019-04-29 RX ORDER — ONDANSETRON 2 MG/ML
INJECTION INTRAMUSCULAR; INTRAVENOUS
Status: COMPLETED
Start: 2019-04-29 | End: 2019-04-29

## 2019-04-29 RX ORDER — GLYCOPYRROLATE 0.2 MG/ML
INJECTION, SOLUTION INTRAMUSCULAR; INTRAVENOUS
Status: COMPLETED
Start: 2019-04-29 | End: 2019-04-29

## 2019-04-29 RX ORDER — KETOROLAC TROMETHAMINE 30 MG/ML
30 INJECTION, SOLUTION INTRAMUSCULAR; INTRAVENOUS EVERY 6 HOURS PRN
Status: DISCONTINUED | OUTPATIENT
Start: 2019-04-29 | End: 2019-05-01

## 2019-04-29 RX ORDER — DIPHENHYDRAMINE HYDROCHLORIDE 50 MG/ML
12.5 INJECTION INTRAMUSCULAR; INTRAVENOUS AS NEEDED
Status: ACTIVE | OUTPATIENT
Start: 2019-04-29 | End: 2019-04-29

## 2019-04-29 RX ORDER — KETOROLAC TROMETHAMINE 30 MG/ML
30 INJECTION, SOLUTION INTRAMUSCULAR; INTRAVENOUS ONCE
Status: COMPLETED | OUTPATIENT
Start: 2019-04-29 | End: 2019-04-29

## 2019-04-29 RX ORDER — KETOROLAC TROMETHAMINE 30 MG/ML
INJECTION, SOLUTION INTRAMUSCULAR; INTRAVENOUS
Status: COMPLETED
Start: 2019-04-29 | End: 2019-04-29

## 2019-04-29 RX ORDER — KETOROLAC TROMETHAMINE 30 MG/ML
15 INJECTION, SOLUTION INTRAMUSCULAR; INTRAVENOUS EVERY 6 HOURS PRN
Status: DISCONTINUED | OUTPATIENT
Start: 2019-04-29 | End: 2019-05-01

## 2019-04-29 RX ORDER — NALOXONE HYDROCHLORIDE 0.4 MG/ML
80 INJECTION, SOLUTION INTRAMUSCULAR; INTRAVENOUS; SUBCUTANEOUS AS NEEDED
Status: ACTIVE | OUTPATIENT
Start: 2019-04-29 | End: 2019-04-29

## 2019-04-29 RX ORDER — MIDAZOLAM HYDROCHLORIDE 1 MG/ML
1 INJECTION INTRAMUSCULAR; INTRAVENOUS EVERY 5 MIN PRN
Status: ACTIVE | OUTPATIENT
Start: 2019-04-29 | End: 2019-04-29

## 2019-04-29 RX ORDER — IBUPROFEN 600 MG/1
600 TABLET ORAL ONCE AS NEEDED
Status: ACTIVE | OUTPATIENT
Start: 2019-04-29 | End: 2019-04-29

## 2019-04-29 RX ORDER — ONDANSETRON 2 MG/ML
4 INJECTION INTRAMUSCULAR; INTRAVENOUS AS NEEDED
Status: ACTIVE | OUTPATIENT
Start: 2019-04-29 | End: 2019-04-29

## 2019-04-29 RX ORDER — DEXAMETHASONE SODIUM PHOSPHATE 4 MG/ML
4 VIAL (ML) INJECTION AS NEEDED
Status: ACTIVE | OUTPATIENT
Start: 2019-04-29 | End: 2019-04-29

## 2019-04-29 RX ORDER — METOCLOPRAMIDE HYDROCHLORIDE 5 MG/ML
10 INJECTION INTRAMUSCULAR; INTRAVENOUS AS NEEDED
Status: ACTIVE | OUTPATIENT
Start: 2019-04-29 | End: 2019-04-29

## 2019-04-29 RX ORDER — SODIUM CHLORIDE, SODIUM LACTATE, POTASSIUM CHLORIDE, CALCIUM CHLORIDE 600; 310; 30; 20 MG/100ML; MG/100ML; MG/100ML; MG/100ML
INJECTION, SOLUTION INTRAVENOUS CONTINUOUS
Status: DISCONTINUED | OUTPATIENT
Start: 2019-04-29 | End: 2019-05-01

## 2019-04-29 RX ORDER — HYDROMORPHONE HYDROCHLORIDE 1 MG/ML
0.4 INJECTION, SOLUTION INTRAMUSCULAR; INTRAVENOUS; SUBCUTANEOUS EVERY 5 MIN PRN
Status: ACTIVE | OUTPATIENT
Start: 2019-04-29 | End: 2019-04-29

## 2019-04-29 RX ORDER — HYDROCODONE BITARTRATE AND ACETAMINOPHEN 5; 325 MG/1; MG/1
2 TABLET ORAL AS NEEDED
Status: DISCONTINUED | OUTPATIENT
Start: 2019-04-29 | End: 2019-05-01

## 2019-04-29 RX ORDER — LABETALOL HYDROCHLORIDE 5 MG/ML
5 INJECTION, SOLUTION INTRAVENOUS EVERY 5 MIN PRN
Status: ACTIVE | OUTPATIENT
Start: 2019-04-29 | End: 2019-04-29

## 2019-04-29 RX ADMIN — GLYCOPYRROLATE 0.2 MG: 0.2 INJECTION, SOLUTION INTRAMUSCULAR; INTRAVENOUS at 06:49:00

## 2019-04-29 RX ADMIN — KETOROLAC TROMETHAMINE 30 MG: 30 INJECTION, SOLUTION INTRAMUSCULAR; INTRAVENOUS at 06:51:00

## 2019-04-29 RX ADMIN — SODIUM CHLORIDE, SODIUM LACTATE, POTASSIUM CHLORIDE, CALCIUM CHLORIDE: 600; 310; 30; 20 INJECTION, SOLUTION INTRAVENOUS at 06:54:00

## 2019-04-29 RX ADMIN — ONDANSETRON 4 MG: 2 INJECTION INTRAMUSCULAR; INTRAVENOUS at 06:54:00

## 2019-04-29 NOTE — PROGRESS NOTES
[facility] / [facility]  ECT History & Physical    Marla Darnell Patient Status:  Outpatient   Age/Gender 29year old female MRN [id number]   Location [address] [facility] Attending Karl Hauser MD   Hosp Day # 0 PCP Rit Tobacco comment: Vaping    Substance and Sexual Activity      Alcohol use: Yes        Comment: rare      Drug use: No        Comment: one episode last Friday smoked weed but never since college      Sexual activity: Not on file    Lifestyle      Physical a normal strength, sensation and reflexes     throughout     Impressions & Plans: F 33.2, bitemporal ECT    I have discussed the risks and benefits and alternatives with the patient/family. They understand and agree to proceed with plan of care.     Myna Leader

## 2019-04-29 NOTE — PROGRESS NOTES
[facility] / [facility]  ECT Procedure Note    Lisa Proper Patient Status:  Outpatient   Age/Gender 29year old female MRN [id number]   Location [address] [facility] Attending Michelle Rey MD   Hosp Day # 0 PCP

## 2019-05-01 ENCOUNTER — HOSPITAL ENCOUNTER (OUTPATIENT)
Dept: POSTOP/PACU | Facility: HOSPITAL | Age: 34
Discharge: HOME | End: 2019-05-01
Attending: Other
Payer: COMMERCIAL

## 2019-05-01 VITALS
SYSTOLIC BLOOD PRESSURE: 148 MMHG | BODY MASS INDEX: 41 KG/M2 | HEART RATE: 78 BPM | OXYGEN SATURATION: 95 % | DIASTOLIC BLOOD PRESSURE: 99 MMHG | TEMPERATURE: 99 F | HEIGHT: 65 IN | RESPIRATION RATE: 19 BRPM

## 2019-05-01 DIAGNOSIS — F33.2 SEVERE EPISODE OF RECURRENT MAJOR DEPRESSIVE DISORDER, WITHOUT PSYCHOTIC FEATURES (HCC): ICD-10-CM

## 2019-05-01 RX ORDER — ONDANSETRON 2 MG/ML
4 INJECTION INTRAMUSCULAR; INTRAVENOUS ONCE
Status: COMPLETED | OUTPATIENT
Start: 2019-05-01 | End: 2019-05-01

## 2019-05-01 RX ORDER — GLYCOPYRROLATE 0.2 MG/ML
0.2 INJECTION, SOLUTION INTRAMUSCULAR; INTRAVENOUS ONCE
Status: COMPLETED | OUTPATIENT
Start: 2019-05-01 | End: 2019-05-01

## 2019-05-01 RX ORDER — HYDROMORPHONE HYDROCHLORIDE 1 MG/ML
0.4 INJECTION, SOLUTION INTRAMUSCULAR; INTRAVENOUS; SUBCUTANEOUS EVERY 5 MIN PRN
Status: ACTIVE | OUTPATIENT
Start: 2019-05-01 | End: 2019-05-01

## 2019-05-01 RX ORDER — KETOROLAC TROMETHAMINE 30 MG/ML
30 INJECTION, SOLUTION INTRAMUSCULAR; INTRAVENOUS ONCE
Status: COMPLETED | OUTPATIENT
Start: 2019-05-01 | End: 2019-05-01

## 2019-05-01 RX ORDER — KETOROLAC TROMETHAMINE 30 MG/ML
INJECTION, SOLUTION INTRAMUSCULAR; INTRAVENOUS
Status: COMPLETED
Start: 2019-05-01 | End: 2019-05-01

## 2019-05-01 RX ORDER — SODIUM CHLORIDE, SODIUM LACTATE, POTASSIUM CHLORIDE, CALCIUM CHLORIDE 600; 310; 30; 20 MG/100ML; MG/100ML; MG/100ML; MG/100ML
INJECTION, SOLUTION INTRAVENOUS CONTINUOUS
Status: DISCONTINUED | OUTPATIENT
Start: 2019-05-01 | End: 2019-05-03

## 2019-05-01 RX ORDER — ONDANSETRON 2 MG/ML
4 INJECTION INTRAMUSCULAR; INTRAVENOUS AS NEEDED
Status: ACTIVE | OUTPATIENT
Start: 2019-05-01 | End: 2019-05-01

## 2019-05-01 RX ORDER — CAFFEINE AND SODIUM BENZOATE 125 MG/ML
250 INJECTION, SOLUTION INTRAMUSCULAR; INTRAVENOUS ONCE
Status: COMPLETED | OUTPATIENT
Start: 2019-05-01 | End: 2019-05-01

## 2019-05-01 RX ORDER — ONDANSETRON 2 MG/ML
INJECTION INTRAMUSCULAR; INTRAVENOUS
Status: COMPLETED
Start: 2019-05-01 | End: 2019-05-01

## 2019-05-01 RX ORDER — GLYCOPYRROLATE 0.2 MG/ML
INJECTION, SOLUTION INTRAMUSCULAR; INTRAVENOUS
Status: COMPLETED
Start: 2019-05-01 | End: 2019-05-01

## 2019-05-01 RX ORDER — NALOXONE HYDROCHLORIDE 0.4 MG/ML
80 INJECTION, SOLUTION INTRAMUSCULAR; INTRAVENOUS; SUBCUTANEOUS AS NEEDED
Status: ACTIVE | OUTPATIENT
Start: 2019-05-01 | End: 2019-05-01

## 2019-05-01 RX ADMIN — CAFFEINE AND SODIUM BENZOATE 250 MG: 125 INJECTION, SOLUTION INTRAMUSCULAR; INTRAVENOUS at 07:19:00

## 2019-05-01 RX ADMIN — SODIUM CHLORIDE, SODIUM LACTATE, POTASSIUM CHLORIDE, CALCIUM CHLORIDE: 600; 310; 30; 20 INJECTION, SOLUTION INTRAVENOUS at 06:54:00

## 2019-05-01 RX ADMIN — GLYCOPYRROLATE 0.2 MG: 0.2 INJECTION, SOLUTION INTRAMUSCULAR; INTRAVENOUS at 06:52:00

## 2019-05-01 RX ADMIN — SODIUM CHLORIDE, SODIUM LACTATE, POTASSIUM CHLORIDE, CALCIUM CHLORIDE: 600; 310; 30; 20 INJECTION, SOLUTION INTRAVENOUS at 08:00:00

## 2019-05-01 RX ADMIN — KETOROLAC TROMETHAMINE 30 MG: 30 INJECTION, SOLUTION INTRAMUSCULAR; INTRAVENOUS at 06:54:00

## 2019-05-01 RX ADMIN — ONDANSETRON 4 MG: 2 INJECTION INTRAMUSCULAR; INTRAVENOUS at 06:53:00

## 2019-05-01 NOTE — PROGRESS NOTES
[facility] / [facility]  ECT Procedure Note    Champ Prakashwer Patient Status:  Outpatient   Age/Gender 29year old female MRN [id number]   Location [address] [facility] Attending Melissa Barnes MD   Hosp Day # 0 PCP seconds       EE seconds    Post-ECT Condition:  Treatment unremarkable    Post-ECT Medications: None    Stacey Olvera

## 2019-05-01 NOTE — PROGRESS NOTES
[facility] / [facility]  ECT History & Physical    Tammy Darnell Patient Status:  Outpatient   Age/Gender 29year old female MRN [id number]   Location [address] [facility] Attending Karen Moore MD   Hosp Day # 0 PCP Becky Hernandez tenderness   Throat:   Lips, mucosa, and tongue normal; teeth and gums normal   Neck:   Supple, symmetrical, trachea midline   Lungs:     Clear to auscultation bilaterally, respirations unlabored    Heart:    Regular rate and rhythm, S1 and S2 normal, no m

## 2019-05-03 ENCOUNTER — HOSPITAL ENCOUNTER (OUTPATIENT)
Dept: POSTOP/PACU | Facility: HOSPITAL | Age: 34
Discharge: HOME | End: 2019-05-03
Attending: Other
Payer: COMMERCIAL

## 2019-05-03 VITALS
HEART RATE: 88 BPM | RESPIRATION RATE: 16 BRPM | HEIGHT: 65 IN | BODY MASS INDEX: 41 KG/M2 | OXYGEN SATURATION: 96 % | SYSTOLIC BLOOD PRESSURE: 131 MMHG | DIASTOLIC BLOOD PRESSURE: 82 MMHG

## 2019-05-03 DIAGNOSIS — F33.2 SEVERE EPISODE OF RECURRENT MAJOR DEPRESSIVE DISORDER, WITHOUT PSYCHOTIC FEATURES (HCC): ICD-10-CM

## 2019-05-03 RX ORDER — GLYCOPYRROLATE 0.2 MG/ML
INJECTION, SOLUTION INTRAMUSCULAR; INTRAVENOUS
Status: COMPLETED
Start: 2019-05-03 | End: 2019-05-03

## 2019-05-03 RX ORDER — KETOROLAC TROMETHAMINE 30 MG/ML
INJECTION, SOLUTION INTRAMUSCULAR; INTRAVENOUS
Status: COMPLETED
Start: 2019-05-03 | End: 2019-05-03

## 2019-05-03 RX ORDER — ACETAMINOPHEN 650 MG/1
650 SUPPOSITORY RECTAL EVERY 6 HOURS PRN
Status: DISCONTINUED | OUTPATIENT
Start: 2019-05-03 | End: 2019-05-03

## 2019-05-03 RX ORDER — CAFFEINE AND SODIUM BENZOATE 125 MG/ML
250 INJECTION, SOLUTION INTRAMUSCULAR; INTRAVENOUS ONCE
Status: COMPLETED | OUTPATIENT
Start: 2019-05-03 | End: 2019-05-03

## 2019-05-03 RX ORDER — ONDANSETRON 2 MG/ML
4 INJECTION INTRAMUSCULAR; INTRAVENOUS AS NEEDED
Status: ACTIVE | OUTPATIENT
Start: 2019-05-03 | End: 2019-05-03

## 2019-05-03 RX ORDER — ONDANSETRON 2 MG/ML
INJECTION INTRAMUSCULAR; INTRAVENOUS
Status: COMPLETED
Start: 2019-05-03 | End: 2019-05-03

## 2019-05-03 RX ORDER — HYDROCODONE BITARTRATE AND ACETAMINOPHEN 10; 325 MG/1; MG/1
1 TABLET ORAL AS NEEDED
Status: DISCONTINUED | OUTPATIENT
Start: 2019-05-03 | End: 2019-05-05

## 2019-05-03 RX ORDER — NALOXONE HYDROCHLORIDE 0.4 MG/ML
80 INJECTION, SOLUTION INTRAMUSCULAR; INTRAVENOUS; SUBCUTANEOUS AS NEEDED
Status: ACTIVE | OUTPATIENT
Start: 2019-05-03 | End: 2019-05-03

## 2019-05-03 RX ORDER — HYDROMORPHONE HYDROCHLORIDE 1 MG/ML
0.4 INJECTION, SOLUTION INTRAMUSCULAR; INTRAVENOUS; SUBCUTANEOUS EVERY 5 MIN PRN
Status: ACTIVE | OUTPATIENT
Start: 2019-05-03 | End: 2019-05-03

## 2019-05-03 RX ORDER — HYDROCODONE BITARTRATE AND ACETAMINOPHEN 10; 325 MG/1; MG/1
2 TABLET ORAL AS NEEDED
Status: DISCONTINUED | OUTPATIENT
Start: 2019-05-03 | End: 2019-05-05

## 2019-05-03 RX ORDER — KETOROLAC TROMETHAMINE 30 MG/ML
30 INJECTION, SOLUTION INTRAMUSCULAR; INTRAVENOUS ONCE
Status: COMPLETED | OUTPATIENT
Start: 2019-05-03 | End: 2019-05-03

## 2019-05-03 RX ORDER — ONDANSETRON 2 MG/ML
4 INJECTION INTRAMUSCULAR; INTRAVENOUS ONCE
Status: COMPLETED | OUTPATIENT
Start: 2019-05-03 | End: 2019-05-03

## 2019-05-03 RX ORDER — SODIUM CHLORIDE, SODIUM LACTATE, POTASSIUM CHLORIDE, CALCIUM CHLORIDE 600; 310; 30; 20 MG/100ML; MG/100ML; MG/100ML; MG/100ML
INJECTION, SOLUTION INTRAVENOUS CONTINUOUS
Status: DISCONTINUED | OUTPATIENT
Start: 2019-05-03 | End: 2019-05-05

## 2019-05-03 RX ORDER — METOCLOPRAMIDE HYDROCHLORIDE 5 MG/ML
10 INJECTION INTRAMUSCULAR; INTRAVENOUS AS NEEDED
Status: ACTIVE | OUTPATIENT
Start: 2019-05-03 | End: 2019-05-03

## 2019-05-03 RX ORDER — ACETAMINOPHEN 325 MG/1
650 TABLET ORAL EVERY 6 HOURS PRN
Status: DISCONTINUED | OUTPATIENT
Start: 2019-05-03 | End: 2019-05-05

## 2019-05-03 RX ORDER — ACETAMINOPHEN 325 MG/1
TABLET ORAL
Status: COMPLETED
Start: 2019-05-03 | End: 2019-05-03

## 2019-05-03 RX ORDER — GLYCOPYRROLATE 0.2 MG/ML
0.2 INJECTION, SOLUTION INTRAMUSCULAR; INTRAVENOUS ONCE
Status: COMPLETED | OUTPATIENT
Start: 2019-05-03 | End: 2019-05-03

## 2019-05-03 RX ADMIN — ONDANSETRON 4 MG: 2 INJECTION INTRAMUSCULAR; INTRAVENOUS at 06:44:00

## 2019-05-03 RX ADMIN — GLYCOPYRROLATE 0.2 MG: 0.2 INJECTION, SOLUTION INTRAMUSCULAR; INTRAVENOUS at 06:44:00

## 2019-05-03 RX ADMIN — ACETAMINOPHEN 650 MG: 325 TABLET ORAL at 08:05:00

## 2019-05-03 RX ADMIN — KETOROLAC TROMETHAMINE 30 MG: 30 INJECTION, SOLUTION INTRAMUSCULAR; INTRAVENOUS at 06:44:00

## 2019-05-03 RX ADMIN — CAFFEINE AND SODIUM BENZOATE 250 MG: 125 INJECTION, SOLUTION INTRAMUSCULAR; INTRAVENOUS at 06:46:00

## 2019-05-03 RX ADMIN — SODIUM CHLORIDE, SODIUM LACTATE, POTASSIUM CHLORIDE, CALCIUM CHLORIDE: 600; 310; 30; 20 INJECTION, SOLUTION INTRAVENOUS at 06:43:00

## 2019-05-03 NOTE — PROGRESS NOTES
[facility] / [facility]  ECT History & Physical    Tammy Darnell Patient Status:  Outpatient   Age/Gender 29year old female MRN [id number]   Location [address] [facility] Attending Karen Moore MD   Hosp Day # 0 PCP Becky Hernandez normal   Neck:   Supple, symmetrical, trachea midline   Lungs:     Clear to auscultation bilaterally, respirations unlabored    Heart:    Regular rate and rhythm, S1 and S2 normal, no murmur, rub   or gallop   Abdomen:     Soft, non-tender, bowel sounds ac

## 2019-05-03 NOTE — PROGRESS NOTES
[facility] / [facility]  ECT Procedure Note    Lashell Ash Patient Status:  Outpatient   Age/Gender 29year old female MRN [id number]   Location [address] [facility] Attending Kandi James MD   Hosp Day # 0 PCP

## 2019-09-30 PROCEDURE — 88305 TISSUE EXAM BY PATHOLOGIST: CPT | Performed by: OBSTETRICS & GYNECOLOGY

## 2021-04-08 ENCOUNTER — IMMUNIZATION (OUTPATIENT)
Dept: LAB | Age: 36
End: 2021-04-08
Attending: HOSPITALIST
Payer: COMMERCIAL

## 2021-04-08 DIAGNOSIS — Z23 NEED FOR VACCINATION: Primary | ICD-10-CM

## 2021-04-08 PROCEDURE — 0001A SARSCOV2 VAC 30MCG/0.3ML IM: CPT

## 2021-04-19 PROBLEM — F31.81 BIPOLAR II DISORDER (HCC): Status: ACTIVE | Noted: 2021-04-19

## 2021-04-29 ENCOUNTER — IMMUNIZATION (OUTPATIENT)
Dept: LAB | Age: 36
End: 2021-04-29
Attending: HOSPITALIST
Payer: COMMERCIAL

## 2021-04-29 DIAGNOSIS — Z23 NEED FOR VACCINATION: Primary | ICD-10-CM

## 2021-04-29 PROCEDURE — 0002A SARSCOV2 VAC 30MCG/0.3ML IM: CPT

## 2021-06-01 PROBLEM — F39 UNSPECIFIED MOOD (AFFECTIVE) DISORDER (HCC): Status: ACTIVE | Noted: 2021-06-01

## 2021-06-01 PROBLEM — F39 UNSPECIFIED MOOD (AFFECTIVE) DISORDER: Status: ACTIVE | Noted: 2021-06-01

## 2021-11-01 ENCOUNTER — IMMUNIZATION (OUTPATIENT)
Dept: LAB | Facility: HOSPITAL | Age: 36
End: 2021-11-01
Attending: EMERGENCY MEDICINE
Payer: COMMERCIAL

## 2021-11-01 DIAGNOSIS — Z23 NEED FOR VACCINATION: Primary | ICD-10-CM

## 2021-11-01 PROCEDURE — 0004A SARSCOV2 VAC 30MCG/0.3ML IM: CPT

## 2022-10-03 ENCOUNTER — IMMUNIZATION (OUTPATIENT)
Dept: LAB | Age: 37
End: 2022-10-03
Attending: EMERGENCY MEDICINE
Payer: COMMERCIAL

## 2022-10-03 DIAGNOSIS — Z23 NEED FOR VACCINATION: Primary | ICD-10-CM

## 2022-10-03 PROCEDURE — 0124A SARSCOV2 VAC BVL 30MCG/0.3ML: CPT

## 2022-11-10 PROBLEM — F33.2 SEVERE EPISODE OF RECURRENT MAJOR DEPRESSIVE DISORDER, WITHOUT PSYCHOTIC FEATURES (HCC): Status: ACTIVE | Noted: 2022-11-10

## 2022-12-19 ENCOUNTER — TELEPHONE (OUTPATIENT)
Facility: CLINIC | Age: 37
End: 2022-12-19

## 2024-05-25 PROBLEM — F33.2 MDD (MAJOR DEPRESSIVE DISORDER), RECURRENT EPISODE, SEVERE (HCC): Status: ACTIVE | Noted: 2024-05-25

## 2024-05-25 PROBLEM — F33.2 SEVERE RECURRENT MAJOR DEPRESSION WITHOUT PSYCHOTIC FEATURES (HCC): Status: ACTIVE | Noted: 2024-05-25

## 2024-05-25 PROBLEM — F41.1 GAD (GENERALIZED ANXIETY DISORDER): Status: ACTIVE | Noted: 2024-05-25

## 2024-05-25 PROBLEM — F33.9 MAJOR DEPRESSION, RECURRENT: Status: RESOLVED | Noted: 2018-04-25 | Resolved: 2024-05-25

## 2024-05-25 PROBLEM — F33.2 SEVERE RECURRENT MAJOR DEPRESSION WITHOUT PSYCHOTIC FEATURES (HCC): Chronic | Status: ACTIVE | Noted: 2024-05-25

## 2024-11-10 PROBLEM — F33.2 SEVERE EPISODE OF RECURRENT MAJOR DEPRESSIVE DISORDER, WITHOUT PSYCHOTIC FEATURES (HCC): Status: ACTIVE | Noted: 2024-05-25

## 2024-11-10 PROBLEM — F33.2 MDD (MAJOR DEPRESSIVE DISORDER), RECURRENT SEVERE, WITHOUT PSYCHOSIS (HCC): Status: ACTIVE | Noted: 2024-11-10

## 2025-02-02 PROBLEM — F33.1 MDD (MAJOR DEPRESSIVE DISORDER), RECURRENT EPISODE, MODERATE (HCC): Status: ACTIVE | Noted: 2018-04-25

## 2025-02-17 ENCOUNTER — OFFICE VISIT (OUTPATIENT)
Dept: FAMILY MEDICINE CLINIC | Facility: CLINIC | Age: 40
End: 2025-02-17
Payer: COMMERCIAL

## 2025-02-17 VITALS
BODY MASS INDEX: 24.69 KG/M2 | TEMPERATURE: 98 F | HEART RATE: 80 BPM | DIASTOLIC BLOOD PRESSURE: 74 MMHG | WEIGHT: 148.19 LBS | HEIGHT: 65 IN | SYSTOLIC BLOOD PRESSURE: 129 MMHG | OXYGEN SATURATION: 98 % | RESPIRATION RATE: 20 BRPM

## 2025-02-17 DIAGNOSIS — J02.9 SORE THROAT: Primary | ICD-10-CM

## 2025-02-17 DIAGNOSIS — H92.02 OTALGIA OF LEFT EAR: ICD-10-CM

## 2025-02-17 PROBLEM — F33.0 MILD EPISODE OF RECURRENT MAJOR DEPRESSIVE DISORDER: Status: ACTIVE | Noted: 2023-10-12

## 2025-02-17 LAB
CONTROL LINE PRESENT WITH A CLEAR BACKGROUND (YES/NO): YES YES/NO
KIT EXPIRATION DATE: NORMAL DATE
KIT LOT #: NORMAL NUMERIC
STREP GRP A CUL-SCR: NEGATIVE

## 2025-02-17 PROCEDURE — 87081 CULTURE SCREEN ONLY: CPT | Performed by: NURSE PRACTITIONER

## 2025-02-17 RX ORDER — FLUCONAZOLE 150 MG/1
TABLET ORAL
COMMUNITY
Start: 2025-01-16

## 2025-02-17 RX ORDER — IBUPROFEN 600 MG/1
600 TABLET, FILM COATED ORAL
COMMUNITY
Start: 2024-12-03

## 2025-02-17 NOTE — PROGRESS NOTES
CHIEF COMPLAINT:     Chief Complaint   Patient presents with    Sore Throat     Hurts a lot to swallow - Entered by patient       HPI:   Tammie Darnell is a 39 year old female presents to clinic with symptoms of sore throat. Patient has had for 3 days. Symptoms have been worsening since onset.  Patient reports following associated symptoms: sore throat, ear pain, denies fever, denies cough, denies sinus pain. Patient reports headache. Patient denies stomach upset. Patient denies rash.  Patient denies history of strep. Patient denies strep pharyngitis exposure.  Treating symptoms with: ibuprofen. Painful swallowing, radiates to left ear. Did a COVID test yesterday negative, . Received flu and COVID vaccines.      Current Outpatient Medications   Medication Sig Dispense Refill    magnesium hydroxide 400 MG/5ML Oral Suspension Take 30 mL by mouth.      ibuprofen 600 MG Oral Tab Take 1 tablet (600 mg total) by mouth.      fluconazole 150 MG Oral Tab TAKE 1 TABLET BY MOUTH TODAY FOR 72 HOURS      sertraline (ZOLOFT) 25 MG Oral Tab Take 1 tab daily x1 week, then 2 tabs daily thereafter 60 tablet 2    diazePAM 5 MG Oral Tab Take 2 tablets (10 mg total) by mouth in the morning, at noon, and at bedtime. 180 tablet 2    hydrOXYzine 25 MG Oral Tab Take 1 tablet (25 mg total) by mouth 3 (three) times daily as needed for Anxiety. 90 tablet 2      Past Medical History:    Anxiety    Anxiety state, unspecified    Depression    Fatty liver    Hyperprolactinemia (HCC)    history of-due to medication    Hyperprolactinemia (HCC)    OCD (obsessive compulsive disorder)      Social History:  Social History     Socioeconomic History    Marital status:    Tobacco Use    Smoking status: Never     Passive exposure: Never    Smokeless tobacco: Never   Vaping Use    Vaping status: Never Used   Substance and Sexual Activity    Alcohol use: Not Currently     Comment: social drinker    Drug use: No    Sexual activity: Not  Currently     Partners: Male     Birth control/protection: Condom     Social Drivers of Health     Food Insecurity: Low Risk  (11/28/2024)    Received from [facility]    Food Insecurity     Have there been times that your food ran out, and you didn't have money to get more?: No     Are there times that you worry that this might happen?: No   Transportation Needs: Low Risk  (11/28/2024)    Received from [facility]    Transportation Needs     Do you have trouble getting transportation to medical appointments?: No   Housing Stability: Low Risk  (11/28/2024)    Received from [facility]    Housing Stability     Are you worried that your electric, gas, oil, or water might be shut off?: No     Are you concerned about having a safe and reliable place to live?: No        REVIEW OF SYSTEMS:   Review of Systems   Constitutional:  Negative for chills and fever.   HENT:  Positive for ear pain and sore throat. Negative for congestion, ear discharge, postnasal drip, rhinorrhea, sinus pressure, sinus pain and trouble swallowing.    Eyes:  Negative for discharge and redness.   Respiratory:  Negative for cough.    Gastrointestinal:  Negative for diarrhea, nausea and vomiting.   Musculoskeletal:  Negative for myalgias.   Skin:  Negative for rash.   Neurological:  Positive for headaches.         EXAM:   /74 (BP Location: Right arm, Patient Position: Sitting, Cuff Size: adult)   Pulse 80   Temp 97.6 °F (36.4 °C) (Oral)   Resp 20   Ht 5' 5\" (1.651 m)   Wt 148 lb 3.2 oz (67.2 kg)   LMP 02/03/2025 (Exact Date)   SpO2 98%   BMI 24.66 kg/m²   Physical Exam  Vitals and nursing note reviewed.   Constitutional:       Appearance: Normal appearance. She is not ill-appearing.   HENT:      Head: Normocephalic and atraumatic.      Right Ear: Hearing, tympanic membrane, ear canal and external ear normal. No drainage. There is no impacted cerumen. Tympanic membrane is not  injected, perforated, erythematous or bulging.      Left Ear: Hearing, tympanic membrane, ear canal and external ear normal. No drainage. There is no impacted cerumen. Tympanic membrane is not injected, perforated, erythematous or bulging.      Nose: Nose normal. No mucosal edema, congestion or rhinorrhea.      Right Sinus: No maxillary sinus tenderness or frontal sinus tenderness.      Left Sinus: No maxillary sinus tenderness or frontal sinus tenderness.      Mouth/Throat:      Lips: No lesions.      Mouth: Mucous membranes are moist. No oral lesions.      Palate: No lesions.      Pharynx: Oropharynx is clear. Uvula midline. Posterior oropharyngeal erythema present. No oropharyngeal exudate.      Tonsils: No tonsillar exudate or tonsillar abscesses. 1+ on the right. 1+ on the left.   Eyes:      General: No scleral icterus.        Right eye: No discharge.         Left eye: No discharge.      Conjunctiva/sclera: Conjunctivae normal.   Cardiovascular:      Rate and Rhythm: Normal rate and regular rhythm.      Heart sounds: Normal heart sounds. No murmur heard.  Pulmonary:      Effort: Pulmonary effort is normal. No tachypnea, accessory muscle usage, respiratory distress or retractions.      Breath sounds: Normal breath sounds. No decreased breath sounds, wheezing, rhonchi or rales.   Lymphadenopathy:      Cervical: No cervical adenopathy.      Right cervical: No superficial or posterior cervical adenopathy.     Left cervical: No superficial or posterior cervical adenopathy.   Skin:     General: Skin is warm and dry.   Neurological:      Mental Status: She is alert and oriented to person, place, and time.   Psychiatric:         Mood and Affect: Mood normal.         Behavior: Behavior normal.          Recent Results (from the past 24 hours)   Rapid Strep    Collection Time: 02/17/25  8:47 AM   Result Value Ref Range    Strep Grp A Screen negative Negative    Control Line Present with a clear background (yes/no) yes  Yes/No    Kit Lot # [id number] Numeric    Kit Expiration Date 3,012,025 Date         ASSESSMENT AND PLAN:   Assessment:   Encounter Diagnoses   Name Primary?    Sore throat Yes    Otalgia of left ear        Plan: Negative rapid strep in clinic today, will send throat culture for painful swallowing.  Will follow with results and recommendations.  Comfort Measures discussed and listed in Patient Instructions.    Requested Prescriptions      No prescriptions requested or ordered in this encounter       Risks, benefits, complications and side effects of meds discussed with patient.     OTC Tylenol/Motrin prn.   Push fluids- warm or cool liquids, whichever is soothing for patient   Warm salt water gargles 2 times per day for at least 3 days.   Use throat lozenges.  Do not share utensils or drinks with anyone.      Follow up with PCP if not improving, condition worsens, or fever greater than or equal to 100.4 persists for 72 hours.      The patient/parent indicates understanding of these issues and agrees to the plan.  The patient is asked to follow up with their PCP prn.

## (undated) NOTE — ED AVS SNAPSHOT
Maris Ortega   MRN: [id number]    Department:  [facility] Emergency Department   Date of Visit:  10/5/2018           Disclosure     Insurance plans vary and the physician(s) referred by the ER may not be covered by your plan.  Please contact tell this physician (or your personal doctor if your instructions are to return to your personal doctor) about any new or lasting problems. The primary care or specialist physician will see patients referred from the [facility] Emergency Department.  Rl Rivera